# Patient Record
Sex: FEMALE | Race: WHITE | NOT HISPANIC OR LATINO | Employment: FULL TIME | URBAN - NONMETROPOLITAN AREA
[De-identification: names, ages, dates, MRNs, and addresses within clinical notes are randomized per-mention and may not be internally consistent; named-entity substitution may affect disease eponyms.]

---

## 2022-05-24 ENCOUNTER — TELEPHONE (OUTPATIENT)
Dept: CARDIOLOGY | Facility: CLINIC | Age: 57
End: 2022-05-24

## 2022-05-24 NOTE — TELEPHONE ENCOUNTER
Could she come in at 9:30 Friday morning or Monday at 2:30? I don't have any stress tests to supervise. PAULO or Jina should be able to overbook. TX!

## 2022-05-24 NOTE — TELEPHONE ENCOUNTER
Pt was referred to us by  Ruba Thomas.  She had some stents put in recently and while she was under she had some episodes of SVT.  She is needing other procedures completed on her kidneys and they were wanting her checked out to make sure there wasn't any issues with her heart first.  She is having a stess test and echo completed tomorrow as well.  You are booking out quite far and I don't have the access to overbook.  Her appointment isn't with you until June 29th, next available.    Please advise.

## 2022-05-27 ENCOUNTER — OFFICE VISIT (OUTPATIENT)
Dept: CARDIOLOGY | Facility: CLINIC | Age: 57
End: 2022-05-27

## 2022-05-27 VITALS
DIASTOLIC BLOOD PRESSURE: 78 MMHG | WEIGHT: 148.6 LBS | HEART RATE: 96 BPM | BODY MASS INDEX: 23.32 KG/M2 | SYSTOLIC BLOOD PRESSURE: 120 MMHG | OXYGEN SATURATION: 96 % | HEIGHT: 67 IN

## 2022-05-27 DIAGNOSIS — I10 PRIMARY HYPERTENSION: ICD-10-CM

## 2022-05-27 DIAGNOSIS — E78.5 HYPERLIPIDEMIA, UNSPECIFIED HYPERLIPIDEMIA TYPE: ICD-10-CM

## 2022-05-27 DIAGNOSIS — N18.9 CHRONIC KIDNEY DISEASE, UNSPECIFIED CKD STAGE: ICD-10-CM

## 2022-05-27 DIAGNOSIS — I47.1 SVT (SUPRAVENTRICULAR TACHYCARDIA): Primary | ICD-10-CM

## 2022-05-27 PROBLEM — N18.32 STAGE 3B CHRONIC KIDNEY DISEASE: Status: ACTIVE | Noted: 2022-05-27

## 2022-05-27 PROCEDURE — 99204 OFFICE O/P NEW MOD 45 MIN: CPT | Performed by: NURSE PRACTITIONER

## 2022-05-27 PROCEDURE — 93000 ELECTROCARDIOGRAM COMPLETE: CPT | Performed by: NURSE PRACTITIONER

## 2022-05-27 RX ORDER — TRAMADOL HYDROCHLORIDE 50 MG/1
TABLET ORAL
COMMUNITY
Start: 2022-05-20 | End: 2022-05-27 | Stop reason: ALTCHOICE

## 2022-05-27 RX ORDER — PRAVASTATIN SODIUM 20 MG
20 TABLET ORAL NIGHTLY
COMMUNITY
Start: 2022-04-21

## 2022-05-27 RX ORDER — LOSARTAN POTASSIUM 100 MG/1
100 TABLET ORAL DAILY
Qty: 90 TABLET | Refills: 3 | Status: SHIPPED | OUTPATIENT
Start: 2022-05-27 | End: 2023-05-27

## 2022-05-27 RX ORDER — LOSARTAN POTASSIUM AND HYDROCHLOROTHIAZIDE 12.5; 1 MG/1; MG/1
TABLET ORAL
COMMUNITY
End: 2022-05-27 | Stop reason: SINTOL

## 2022-05-27 RX ORDER — SULFAMETHOXAZOLE AND TRIMETHOPRIM 400; 80 MG/1; MG/1
TABLET ORAL
COMMUNITY
Start: 2022-05-20 | End: 2022-05-27 | Stop reason: ALTCHOICE

## 2022-05-27 RX ORDER — PHENAZOPYRIDINE HYDROCHLORIDE 200 MG/1
200 TABLET, FILM COATED ORAL 2 TIMES DAILY PRN
COMMUNITY
End: 2022-08-01 | Stop reason: ALTCHOICE

## 2022-05-27 RX ORDER — KETOROLAC TROMETHAMINE 10 MG/1
10 TABLET, FILM COATED ORAL 2 TIMES DAILY PRN
COMMUNITY
End: 2022-08-01

## 2022-05-27 NOTE — PROGRESS NOTES
Encounter Date:05/27/2022  Chief Complaint:   Subjective    Nubia Buitrago is a 56 y.o. female who presents to [facility] today for cardiac evaluation at the request of Ruba Thomas NP. She is accompanied by her . She had SVT during recent bilateral ureteral stents and lithotripsy. CRNA told them it didn't last very long and responded to Mg and potassium. Needs cardiac clearance for further urology procedures by Dr. Moreau. Worsened kidney function recently.      History of Present Illness   HPI     CARDIAC CLEARANCE      Additional comments: Needs further urology intervention. Low risk stress echo and normal echo.              Rapid Heart Rate      Additional comments: During prolonged sedation with recent urology procedure. She has had palpitations a few times in the past when really stressed or dehydrated. Not worsened lately. Doesn't know how long lasted.              Holter Monitor      Additional comments: Currently wearing holter monitor/ placed wed 5/25/22 to wear for 7 days.              Hypertension      Additional comments: Well controlled with Hyzaar but gets dehydrated easily.              Hyperlipidemia      Additional comments: Just started pravastatin about a month ago. Just a little elevated on last lipid panel. Has never been elevated in the past.          Last edited by Gloria Ha APRN on 5/27/2022  9:48 AM. (History)        Past Medical History:   Diagnosis Date   • Biliary dyskinesia    • Candidiasis of vagina    • Cervical disc disorder at C5-C6 level with radiculopathy    • COVID-19 07/2021    not hospitalized, no antibody treatment   • Hematuria    • Hyperlipidemia    • Hypertension    • Kidney stone    • Proteinuria    • SVT (supraventricular tachycardia) (HCC)    • Uterine fibroid    • UTI (urinary tract infection)      Past Surgical History:   Procedure Laterality Date   • BREAST SURGERY     • CERVICAL FUSION  2009   •  "CHOLECYSTECTOMY     • CYSTOSCOPY RETROGRADE PYELOGRAM     • HYSTERECTOMY       Family History   Problem Relation Age of Onset   • Hyperlipidemia Mother    • Hypertension Mother    • Heart attack Mother    • Heart disease Mother    • Lung cancer Mother    • Heart disease Father    • Stroke Father    • No Known Problems Brother    • No Known Problems Half-Brother    • Kidney nephrosis Half-Sister      Social History     Socioeconomic History   • Marital status:    Tobacco Use   • Smoking status: Never Smoker   • Smokeless tobacco: Never Used   Vaping Use   • Vaping Use: Never used   Substance and Sexual Activity   • Alcohol use: Not Currently     Comment: never heavy drinker, occasional mixed drink   • Drug use: Never   • Sexual activity: Defer     Partners: Male     Birth control/protection: Surgical     History: Past medical, surgical, family, and social history reviewed.    Outpatient Medications Marked as Taking for the 5/27/22 encounter (Office Visit) with Gloria Ha APRN   Medication Sig Dispense Refill   • ketorolac (TORADOL) 10 MG tablet Take 10 mg by mouth 2 (Two) Times a Day As Needed.     • phenazopyridine (PYRIDIUM) 200 MG tablet Take 200 mg by mouth 2 (Two) Times a Day As Needed. AS NEEDED FOR BLADDER SPASMS     • pravastatin (PRAVACHOL) 20 MG tablet Take 20 mg by mouth Daily.     • [DISCONTINUED] losartan-hydrochlorothiazide (HYZAAR) 100-12.5 MG per tablet Patient stays dehydrated- stated may want to change to without hctz          Objective     Vital Signs:   /78 (BP Location: Right arm, Patient Position: Sitting, Cuff Size: Adult)   Pulse 96   Ht 170.2 cm (67\")   Wt 67.4 kg (148 lb 9.6 oz)   SpO2 96%   BMI 23.27 kg/m²   Wt Readings from Last 3 Encounters:   05/27/22 67.4 kg (148 lb 9.6 oz)         Vitals reviewed.   Constitutional:       Appearance: Well-developed.   Eyes:      General: No scleral icterus.  Neck:      Vascular: No JVD.   Pulmonary:      Effort: Pulmonary " effort is normal.      Breath sounds: Normal breath sounds.   Cardiovascular:      Normal rate. Regular rhythm.      No gallop.   Pulses:     Intact distal pulses.   Edema:     Peripheral edema absent.   Skin:     General: Skin is warm and dry.   Neurological:      Mental Status: Alert and oriented to person, place, and time.         Result Review  Data Reviewed:  The following data was reviewed by: MARIANA Hampton on 05/27/2022  Scan on 5/25/2022 by Gloria Ha APRN: STRESS ECHO/ [facility]/ 5-  Scan on 5/25/2022 by Gloria Ha APRN: TTE/ [facility]/ 5-  Scan on 5/23/2022 by [employer], [facility]: [email or web address]  Scan on 5/23/2022 by Glorai Ha APRN: CBC/CMP/TSH/T4/BMP/ BMP/ [facility]/ 5-   Scan on 5/20/2022 by [employer], [facility]: [email or web address]__[email or web address]   Scan on 5/18/2022 by [employer], [facility]: [email or web address]   Scan on 1/25/2022 by [employer], [facility]: [email or web address]           ECG 12 Lead    Date/Time: 5/27/2022 9:54 AM  Performed by: Gloria Ha APRN  Authorized by: Gloria Ha APRN   Comparison: compared with previous ECG from 5/23/2022  Similar to previous ECG  Rhythm: sinus rhythm  Rate: normal  BPM: 89                 Assessment and Plan   Problem List Items Addressed This Visit        Cardiac and Vasculature    SVT (supraventricular tachycardia) (HCC) - Primary    Overview     Low risk SE and normal echo 5/2022.           Current Assessment & Plan     Fairly stable. Add low dose metoprolol. Avoid dehydration, stimulants, and decongestants. Call if worsens and will consider anti-arrhythmic and/or referral to EP. Await results of extended Holter. May be a possible complication or sequela from COVID-19 infection. Low risk of major adverse cardiovascular event with upcoming urology procedures.           Relevant Medications    metoprolol tartrate (LOPRESSOR) 25 MG tablet    Other Relevant Orders     ECG 12 Lead    Hyperlipidemia    Current Assessment & Plan     Recently started on pravastatin. Recommend rechecking CMP & lipid panel in 2-4 weeks. Encourage healthy diet and routine aerobic exercise.           Relevant Medications    pravastatin (PRAVACHOL) 20 MG tablet    Hypertension    Current Assessment & Plan     Well controlled. Change Hyzaar back to losartan. Add low dose metoprolol for SVT. Goal BP less than 130/80. Call if any hypotension - may need to decrease losartan.           Relevant Medications    losartan (Cozaar) 100 MG tablet    metoprolol tartrate (LOPRESSOR) 25 MG tablet    Other Relevant Orders    ECG 12 Lead      Other Visit Diagnoses     Chronic kidney disease, unspecified CKD stage        Relevant Medications    losartan (Cozaar) 100 MG tablet        Patient was given instructions and counseling regarding her condition or for health maintenance advice. Please see specific information pulled into the AVS if appropriate.    Follow Up :   Return in about 2 months (around 7/27/2022) for Recheck.       Time Spent: I spent 45 minutes caring for Nubia on this date of service. This time includes time spent by me in the following activities: reviewing tests, performing a medically appropriate examination and/or evaluation, counseling and educating the patient/family/caregiver, ordering medications, tests, or procedures and documenting information in the medical record.     I spent 2 minutes on the separately reported service of EKG. This time is not included in the time used to support the E/M service also reported today.        MARIANA Carrasco, ACNP-BC, CHFN-BC

## 2022-05-27 NOTE — ASSESSMENT & PLAN NOTE
Repeat labs per PCP discretion but needs repeat CMP & lipid panel in 2-4 weeks. Encourage hydration and avoid NSAIDs if at all possible.

## 2022-05-27 NOTE — ASSESSMENT & PLAN NOTE
Recently started on pravastatin. Recommend rechecking CMP & lipid panel in 2-4 weeks. Encourage healthy diet and routine aerobic exercise.

## 2022-05-27 NOTE — ASSESSMENT & PLAN NOTE
Well controlled. Change Hyzaar back to losartan. Add low dose metoprolol for SVT. Goal BP less than 130/80. Call if any hypotension - may need to decrease losartan.

## 2022-05-27 NOTE — ASSESSMENT & PLAN NOTE
Fairly stable. Add low dose metoprolol. Avoid dehydration, stimulants, and decongestants. Call if worsens and will consider anti-arrhythmic and/or referral to EP. Await results of extended Holter. May be a possible complication or sequela from COVID-19 infection. Low risk of major adverse cardiovascular event with upcoming urology procedures.

## 2022-08-01 ENCOUNTER — OFFICE VISIT (OUTPATIENT)
Dept: CARDIOLOGY | Facility: CLINIC | Age: 57
End: 2022-08-01

## 2022-08-01 VITALS
DIASTOLIC BLOOD PRESSURE: 82 MMHG | HEART RATE: 70 BPM | WEIGHT: 155.3 LBS | BODY MASS INDEX: 24.37 KG/M2 | OXYGEN SATURATION: 99 % | SYSTOLIC BLOOD PRESSURE: 128 MMHG | HEIGHT: 67 IN

## 2022-08-01 DIAGNOSIS — E78.5 HYPERLIPIDEMIA, UNSPECIFIED HYPERLIPIDEMIA TYPE: Chronic | ICD-10-CM

## 2022-08-01 DIAGNOSIS — I10 PRIMARY HYPERTENSION: Chronic | ICD-10-CM

## 2022-08-01 DIAGNOSIS — I47.1 SVT (SUPRAVENTRICULAR TACHYCARDIA): Primary | Chronic | ICD-10-CM

## 2022-08-01 PROCEDURE — 99214 OFFICE O/P EST MOD 30 MIN: CPT | Performed by: NURSE PRACTITIONER

## 2022-08-01 NOTE — ASSESSMENT & PLAN NOTE
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

## 2022-08-01 NOTE — ASSESSMENT & PLAN NOTE
Well controlled. Continue losartan and low dose metoprolol for PSVT. Goal BP less than 130/80. Call if any hypotension - may need to decrease losartan.

## 2022-08-01 NOTE — PROGRESS NOTES
"Encounter Date:08/01/2022  Chief Complaint:   Subjective    Nubia Buitrago is a 56 y.o. female who presents to [facility] [place] today for two month cardiac follow-up.      History of Present Illness   HPI     Rapid Heart Rate      Additional comments: Multiple short runs of SVT per 5/2022 extended Holter. Still noticing intermittent palpitations for a few seconds. Feels like she notices more because she is now aware. Not sure if addition of metoprolol has helped other than improving BP.              Hypertension      Additional comments: Running 120s/70s on losartan and low dose metoprolol (improved from 140s). No headaches, dizziness, or nosebleeds.              Hyperlipidemia      Additional comments: Tolerating pravastatin but didn't take consistently until after kidney stone procedures. Due to return for additional labs in September.              Follow-up      Additional comments: 2 MO FU          Last edited by Gloria Ha APRN on 8/1/2022 10:13 AM. (History)        History: Past medical, surgical, family, and social history reviewed.    Outpatient Medications Marked as Taking for the 8/1/22 encounter (Office Visit) with Gloria Ha APRN   Medication Sig Dispense Refill   • losartan (Cozaar) 100 MG tablet Take 1 tablet by mouth Daily. 90 tablet 3   • metoprolol tartrate (LOPRESSOR) 25 MG tablet Take 0.5 tablets by mouth 2 (Two) Times a Day. 90 tablet 3   • pravastatin (PRAVACHOL) 20 MG tablet Take 20 mg by mouth Every Night.     • [DISCONTINUED] metoprolol tartrate (LOPRESSOR) 25 MG tablet Take 0.5-1 tablets by mouth 2 (Two) Times a Day. (Patient taking differently: Take 12.5 mg by mouth Daily.) 180 tablet 3        Objective     Vital Signs:   /82 (BP Location: Right arm, Patient Position: Sitting, Cuff Size: Adult)   Pulse 70   Ht 170.2 cm (67.01\")   Wt 70.4 kg (155 lb 4.8 oz)   SpO2 99%   BMI 24.32 kg/m²   Wt Readings from Last 3 Encounters: "   08/01/22 70.4 kg (155 lb 4.8 oz)   05/27/22 67.4 kg (148 lb 9.6 oz)         Vitals reviewed.   Constitutional:       Appearance: Well-developed.   Eyes:      General: No scleral icterus.  Neck:      Vascular: No JVD.   Pulmonary:      Effort: Pulmonary effort is normal.      Breath sounds: Normal breath sounds.   Cardiovascular:      Normal rate. Regular rhythm.      No gallop.   Pulses:     Intact distal pulses.   Edema:     Peripheral edema absent.   Skin:     General: Skin is warm and dry.   Neurological:      Mental Status: Alert and oriented to person, place, and time.         Result Review  Data Reviewed:  The following data was reviewed by: MARIANA Hampton on 08/01/2022  Scan on 6/2/2022 by Gloria Ha APRN: [facility]/ 6-2-2022   Scan on 5/25/2022 by Gloria Ha APRN: HOLTER MONITOR 5/25/22               Assessment and Plan   Problem List Items Addressed This Visit        Cardiac and Vasculature    SVT (supraventricular tachycardia) (HCC) - Primary (Chronic)    Overview     Low risk SE and normal echo 5/2022.         Current Assessment & Plan     Fairly stable. Continue low dose metoprolol but advised her to take 1/2 tab twice a day. Again advised her to avoid dehydration, stimulants, and decongestants. Call if worsens and will consider anti-arrhythmic and/or referral to EP. Multiple short runs of SVT per extended Holter. May be a possible complication or sequela from COVID-19 infection.          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         Hypertension (Chronic)    Current Assessment & Plan     Well controlled. Continue losartan and low dose metoprolol for PSVT. Goal BP less than 130/80. Call if any hypotension - may need to decrease  losartan.         Relevant Medications    metoprolol tartrate (LOPRESSOR) 25 MG tablet        Patient was given instructions and counseling regarding her condition or for health maintenance advice. Please see specific information pulled into the AVS if appropriate.    Follow Up :   Return in about 6 months (around 2/1/2023) for Recheck.             MARIANA Carrasco, ACNP-BC, CHFN-BC

## 2022-08-01 NOTE — ASSESSMENT & PLAN NOTE
Fairly stable. Continue low dose metoprolol but advised her to take 1/2 tab twice a day. Again advised her to avoid dehydration, stimulants, and decongestants. Call if worsens and will consider anti-arrhythmic and/or referral to EP. Multiple short runs of SVT per extended Holter. May be a possible complication or sequela from COVID-19 infection.

## 2023-08-06 DIAGNOSIS — N18.9 CHRONIC KIDNEY DISEASE, UNSPECIFIED CKD STAGE: ICD-10-CM

## 2023-08-06 DIAGNOSIS — I10 PRIMARY HYPERTENSION: ICD-10-CM

## 2023-08-07 NOTE — TELEPHONE ENCOUNTER
"Looks like theres just a note that says \"appt needed for further refills\" but jose e if anyone actually called the patient.  "

## 2023-08-08 RX ORDER — LOSARTAN POTASSIUM 100 MG/1
100 TABLET ORAL DAILY
Qty: 30 TABLET | Refills: 0 | OUTPATIENT
Start: 2023-08-08 | End: 2024-08-07

## 2023-08-08 NOTE — TELEPHONE ENCOUNTER
Called pt. Stated pcp, pamela ramirez, has been refilling. Declined r/s office visit with us. Advised her to call her pcp for refills. She voiced understanding. Told her to let us know if she needs anything. Thx

## 2024-02-01 DIAGNOSIS — I47.10 SVT (SUPRAVENTRICULAR TACHYCARDIA): Chronic | ICD-10-CM
